# Patient Record
Sex: FEMALE | Race: BLACK OR AFRICAN AMERICAN | NOT HISPANIC OR LATINO | URBAN - METROPOLITAN AREA
[De-identification: names, ages, dates, MRNs, and addresses within clinical notes are randomized per-mention and may not be internally consistent; named-entity substitution may affect disease eponyms.]

---

## 2017-01-06 ENCOUNTER — FOLLOW UP ESTABLISHED (OUTPATIENT)
Dept: URBAN - METROPOLITAN AREA CLINIC 24 | Facility: CLINIC | Age: 82
End: 2017-01-06
Payer: MEDICARE

## 2017-01-06 PROCEDURE — 92012 INTRM OPH EXAM EST PATIENT: CPT | Performed by: OPHTHALMOLOGY

## 2017-01-06 ASSESSMENT — INTRAOCULAR PRESSURE
OS: 12
OD: 13

## 2017-05-05 ENCOUNTER — FOLLOW UP ESTABLISHED (OUTPATIENT)
Dept: URBAN - METROPOLITAN AREA CLINIC 24 | Facility: CLINIC | Age: 82
End: 2017-05-05
Payer: MEDICARE

## 2017-05-05 PROCEDURE — 92014 COMPRE OPH EXAM EST PT 1/>: CPT | Performed by: OPHTHALMOLOGY

## 2017-05-05 PROCEDURE — 92133 CPTRZD OPH DX IMG PST SGM ON: CPT | Performed by: OPHTHALMOLOGY

## 2017-05-05 ASSESSMENT — INTRAOCULAR PRESSURE
OD: 12
OS: 12

## 2017-09-01 ENCOUNTER — FOLLOW UP ESTABLISHED (OUTPATIENT)
Dept: URBAN - METROPOLITAN AREA CLINIC 24 | Facility: CLINIC | Age: 82
End: 2017-09-01
Payer: MEDICARE

## 2017-09-01 PROCEDURE — 92083 EXTENDED VISUAL FIELD XM: CPT | Performed by: OPHTHALMOLOGY

## 2017-09-01 PROCEDURE — 92012 INTRM OPH EXAM EST PATIENT: CPT | Performed by: OPHTHALMOLOGY

## 2017-09-01 RX ORDER — BIMATOPROST 0.1 MG/ML
0.01 % SOLUTION/ DROPS OPHTHALMIC
Qty: 3 | Refills: 1 | Status: INACTIVE
Start: 2017-09-01 | End: 2018-03-30

## 2017-09-01 RX ORDER — DORZOLAMIDE HYDROCHLORIDE AND TIMOLOL MALEATE 20; 5 MG/ML; MG/ML
SOLUTION/ DROPS OPHTHALMIC
Qty: 180 | Refills: 5 | Status: INACTIVE
Start: 2017-09-01 | End: 2018-03-30

## 2017-09-01 ASSESSMENT — INTRAOCULAR PRESSURE
OD: 12
OS: 11

## 2017-11-29 ENCOUNTER — FOLLOW UP ESTABLISHED (OUTPATIENT)
Dept: URBAN - METROPOLITAN AREA CLINIC 24 | Facility: CLINIC | Age: 82
End: 2017-11-29
Payer: MEDICARE

## 2017-11-29 DIAGNOSIS — H40.1333 PIGMENTARY GLAUCOMA, BILATERAL, SEVERE STAGE: Primary | ICD-10-CM

## 2017-11-29 DIAGNOSIS — H04.123 DRY EYE SYNDROME OF BILATERAL LACRIMAL GLANDS: ICD-10-CM

## 2017-11-29 PROCEDURE — 92012 INTRM OPH EXAM EST PATIENT: CPT | Performed by: OPHTHALMOLOGY

## 2017-11-29 ASSESSMENT — INTRAOCULAR PRESSURE
OD: 12
OS: 12

## 2018-03-30 ENCOUNTER — FOLLOW UP ESTABLISHED (OUTPATIENT)
Dept: URBAN - METROPOLITAN AREA CLINIC 24 | Facility: CLINIC | Age: 83
End: 2018-03-30
Payer: MEDICARE

## 2018-03-30 PROCEDURE — 92014 COMPRE OPH EXAM EST PT 1/>: CPT | Performed by: OPHTHALMOLOGY

## 2018-03-30 RX ORDER — BIMATOPROST 0.1 MG/ML
0.01 % SOLUTION/ DROPS OPHTHALMIC
Qty: 3 | Refills: 1 | Status: INACTIVE
Start: 2018-03-30 | End: 2019-05-06

## 2018-03-30 RX ORDER — DORZOLAMIDE HYDROCHLORIDE AND TIMOLOL MALEATE 20; 5 MG/ML; MG/ML
SOLUTION/ DROPS OPHTHALMIC
Qty: 180 | Refills: 5 | Status: INACTIVE
Start: 2018-03-30 | End: 2019-05-06

## 2018-03-30 ASSESSMENT — INTRAOCULAR PRESSURE
OD: 13
OS: 13

## 2018-07-25 ENCOUNTER — FOLLOW UP ESTABLISHED (OUTPATIENT)
Dept: URBAN - METROPOLITAN AREA CLINIC 24 | Facility: CLINIC | Age: 83
End: 2018-07-25
Payer: MEDICARE

## 2018-07-25 PROCEDURE — 92083 EXTENDED VISUAL FIELD XM: CPT | Performed by: OPHTHALMOLOGY

## 2018-07-25 PROCEDURE — 92012 INTRM OPH EXAM EST PATIENT: CPT | Performed by: OPHTHALMOLOGY

## 2018-07-25 ASSESSMENT — INTRAOCULAR PRESSURE
OS: 12
OD: 13

## 2018-10-17 ENCOUNTER — FOLLOW UP ESTABLISHED (OUTPATIENT)
Dept: URBAN - METROPOLITAN AREA CLINIC 24 | Facility: CLINIC | Age: 83
End: 2018-10-17
Payer: MEDICARE

## 2018-10-17 PROCEDURE — 92012 INTRM OPH EXAM EST PATIENT: CPT | Performed by: OPHTHALMOLOGY

## 2018-10-17 PROCEDURE — 92133 CPTRZD OPH DX IMG PST SGM ON: CPT | Performed by: OPHTHALMOLOGY

## 2018-10-17 ASSESSMENT — INTRAOCULAR PRESSURE
OS: 13
OD: 14

## 2019-02-06 ENCOUNTER — FOLLOW UP ESTABLISHED (OUTPATIENT)
Dept: URBAN - METROPOLITAN AREA CLINIC 24 | Facility: CLINIC | Age: 84
End: 2019-02-06
Payer: MEDICARE

## 2019-02-06 PROCEDURE — 92012 INTRM OPH EXAM EST PATIENT: CPT | Performed by: OPHTHALMOLOGY

## 2019-02-06 ASSESSMENT — INTRAOCULAR PRESSURE
OD: 14
OS: 14

## 2019-02-13 ENCOUNTER — FOLLOW UP ESTABLISHED (OUTPATIENT)
Dept: URBAN - METROPOLITAN AREA CLINIC 24 | Facility: CLINIC | Age: 84
End: 2019-02-13
Payer: MEDICARE

## 2019-02-13 DIAGNOSIS — H52.4 PRESBYOPIA: ICD-10-CM

## 2019-02-13 PROCEDURE — 92014 COMPRE OPH EXAM EST PT 1/>: CPT | Performed by: OPTOMETRIST

## 2019-02-13 ASSESSMENT — INTRAOCULAR PRESSURE
OS: 14
OD: 14

## 2019-02-13 ASSESSMENT — VISUAL ACUITY
OD: 20/20
OS: 20/20

## 2019-02-13 ASSESSMENT — KERATOMETRY
OS: 44.36
OD: 44.24

## 2019-06-12 ENCOUNTER — FOLLOW UP ESTABLISHED (OUTPATIENT)
Dept: URBAN - METROPOLITAN AREA CLINIC 24 | Facility: CLINIC | Age: 84
End: 2019-06-12
Payer: MEDICARE

## 2019-06-12 PROCEDURE — 92083 EXTENDED VISUAL FIELD XM: CPT | Performed by: OPHTHALMOLOGY

## 2019-06-12 PROCEDURE — 92012 INTRM OPH EXAM EST PATIENT: CPT | Performed by: OPHTHALMOLOGY

## 2019-06-12 ASSESSMENT — INTRAOCULAR PRESSURE
OS: 14
OD: 13

## 2019-10-30 ENCOUNTER — FOLLOW UP ESTABLISHED (OUTPATIENT)
Dept: URBAN - METROPOLITAN AREA CLINIC 24 | Facility: CLINIC | Age: 84
End: 2019-10-30
Payer: MEDICARE

## 2019-10-30 PROCEDURE — 92012 INTRM OPH EXAM EST PATIENT: CPT | Performed by: OPHTHALMOLOGY

## 2019-10-30 PROCEDURE — 92133 CPTRZD OPH DX IMG PST SGM ON: CPT | Performed by: OPHTHALMOLOGY

## 2019-10-30 ASSESSMENT — INTRAOCULAR PRESSURE
OS: 13
OD: 13

## 2019-11-26 ENCOUNTER — APPOINTMENT (RX ONLY)
Dept: URBAN - METROPOLITAN AREA CLINIC 323 | Facility: CLINIC | Age: 84
Setting detail: DERMATOLOGY
End: 2019-11-26

## 2019-11-26 DIAGNOSIS — L81.4 OTHER MELANIN HYPERPIGMENTATION: ICD-10-CM

## 2019-11-26 DIAGNOSIS — D18.0 HEMANGIOMA: ICD-10-CM

## 2019-11-26 DIAGNOSIS — L82.1 OTHER SEBORRHEIC KERATOSIS: ICD-10-CM

## 2019-11-26 DIAGNOSIS — D22 MELANOCYTIC NEVI: ICD-10-CM

## 2019-11-26 DIAGNOSIS — L72.8 OTHER FOLLICULAR CYSTS OF THE SKIN AND SUBCUTANEOUS TISSUE: ICD-10-CM

## 2019-11-26 PROBLEM — D22.5 MELANOCYTIC NEVI OF TRUNK: Status: ACTIVE | Noted: 2019-11-26

## 2019-11-26 PROBLEM — D22.72 MELANOCYTIC NEVI OF LEFT LOWER LIMB, INCLUDING HIP: Status: ACTIVE | Noted: 2019-11-26

## 2019-11-26 PROBLEM — D18.01 HEMANGIOMA OF SKIN AND SUBCUTANEOUS TISSUE: Status: ACTIVE | Noted: 2019-11-26

## 2019-11-26 PROBLEM — D22.62 MELANOCYTIC NEVI OF LEFT UPPER LIMB, INCLUDING SHOULDER: Status: ACTIVE | Noted: 2019-11-26

## 2019-11-26 PROBLEM — D22.61 MELANOCYTIC NEVI OF RIGHT UPPER LIMB, INCLUDING SHOULDER: Status: ACTIVE | Noted: 2019-11-26

## 2019-11-26 PROBLEM — D22.71 MELANOCYTIC NEVI OF RIGHT LOWER LIMB, INCLUDING HIP: Status: ACTIVE | Noted: 2019-11-26

## 2019-11-26 PROCEDURE — 11401 EXC TR-EXT B9+MARG 0.6-1 CM: CPT | Mod: 76

## 2019-11-26 PROCEDURE — ? SUNSCREEN RECOMMENDATIONS

## 2019-11-26 PROCEDURE — 11401 EXC TR-EXT B9+MARG 0.6-1 CM: CPT

## 2019-11-26 PROCEDURE — ? COUNSELING

## 2019-11-26 PROCEDURE — 99203 OFFICE O/P NEW LOW 30 MIN: CPT | Mod: 25

## 2019-11-26 PROCEDURE — ? PUNCH EXCISION

## 2019-11-26 ASSESSMENT — LOCATION SIMPLE DESCRIPTION DERM
LOCATION SIMPLE: ABDOMEN
LOCATION SIMPLE: RIGHT UPPER BACK
LOCATION SIMPLE: LEFT PRETIBIAL REGION
LOCATION SIMPLE: UPPER BACK
LOCATION SIMPLE: LEFT FOREARM
LOCATION SIMPLE: RIGHT CHEEK
LOCATION SIMPLE: RIGHT PRETIBIAL REGION
LOCATION SIMPLE: LEFT UPPER ARM
LOCATION SIMPLE: RIGHT FOREARM
LOCATION SIMPLE: LEFT UPPER BACK

## 2019-11-26 ASSESSMENT — LOCATION ZONE DERM
LOCATION ZONE: LEG
LOCATION ZONE: ARM
LOCATION ZONE: FACE
LOCATION ZONE: TRUNK

## 2019-11-26 ASSESSMENT — LOCATION DETAILED DESCRIPTION DERM
LOCATION DETAILED: RIGHT INFERIOR CENTRAL MALAR CHEEK
LOCATION DETAILED: SUPERIOR THORACIC SPINE
LOCATION DETAILED: LEFT SUPERIOR UPPER BACK
LOCATION DETAILED: INFERIOR THORACIC SPINE
LOCATION DETAILED: LEFT VENTRAL DISTAL FOREARM
LOCATION DETAILED: RIGHT MID-UPPER BACK
LOCATION DETAILED: EPIGASTRIC SKIN
LOCATION DETAILED: RIGHT VENTRAL DISTAL FOREARM
LOCATION DETAILED: LEFT DISTAL PRETIBIAL REGION
LOCATION DETAILED: RIGHT DISTAL PRETIBIAL REGION
LOCATION DETAILED: LEFT LATERAL UPPER BACK
LOCATION DETAILED: LEFT VENTRAL PROXIMAL FOREARM
LOCATION DETAILED: LEFT PROXIMAL PRETIBIAL REGION
LOCATION DETAILED: LEFT ANTECUBITAL SKIN
LOCATION DETAILED: LEFT MEDIAL UPPER BACK
LOCATION DETAILED: RIGHT INFERIOR MEDIAL UPPER BACK
LOCATION DETAILED: RIGHT PROXIMAL PRETIBIAL REGION
LOCATION DETAILED: RIGHT VENTRAL PROXIMAL FOREARM

## 2019-11-26 NOTE — PROCEDURE: PUNCH EXCISION
Excision Depth: adipose tissue
Complex Repair Preamble Text (Leave Blank If You Do Not Want): Extensive wide undermining was performed.
5 Mm Punch Excision Text: A 5 mm punch biopsy was used to make an initial incision over the lesion.  After this overlying column of skin was removed, blunt dissection was used to free the lesion from the surrounding tissues and the lesion was extirpated through the surgical opening made by the punch biopsy.
Medical Necessity Clause: This procedure was medically necessary because the lesion that was treated was:
6 Mm Punch Excision Text: A 6 mm punch biopsy was used to make an initial incision over the lesion.  After this overlying column of skin was removed, blunt dissection was used to free the lesion from the surrounding tissues and the lesion was extirpated through the surgical opening made by the punch biopsy.
Anesthesia Volume In Cc: 0
Lab: -[id number]
Bill For Surgical Tray: no
Consent was obtained from the patient. The risks and benefits to therapy were discussed in detail. Specifically, the risks of infection, scarring, bleeding, prolonged wound healing, incomplete removal, allergy to anesthesia, nerve injury and recurrence were addressed. Prior to the procedure, the treatment site was clearly identified and confirmed by the patient. All components of Universal Protocol/PAUSE Rule completed.
Lab Facility: [id number]
Intermediate Repair Preamble Text (Leave Blank If You Do Not Want): Undermining was performed with blunt dissection.
Additional Anesthesia Volume In Cc: 6
Excision Method: 6 mm Punch
7 Mm Punch Excision Text: A 7 mm punch biopsy was used to make an initial incision over the lesion.  After this overlying column of skin was removed, blunt dissection was used to free the lesion from the surrounding tissues and the lesion was extirpated through the surgical opening made by the punch biopsy.
Medical Necessity Clause: The excision was medically necessary because the lesion which was excised was
Wound Care: Petrolatum
Detail Level: Detailed
Anesthesia Type: 1% lidocaine with epinephrine
1.5 Mm Punch Excision Text: A 1.5 mm punch biopsy was used to make an initial incision over the lesion.  After this overlying column of skin was removed, blunt dissection was used to free the lesion from the surrounding tissues and the lesion was extirpated through the surgical opening made by the punch biopsy.
Render Post-Care Instructions In Note?: yes
Epidermal Sutures: 4-0 Ethilon
8 Mm Punch Excision Text: A 8 mm punch biopsy was used to make an initial incision over the lesion.  After this overlying column of skin was removed, blunt dissection was used to free the lesion from the surrounding tissues and the lesion was extirpated through the surgical opening made by the punch biopsy.
Repair Type: None
10 Mm Punch Excision Text: A 10 mm punch biopsy was used to make an initial incision over the lesion.  After this overlying column of skin was removed, blunt dissection was used to free the lesion from the surrounding tissues and the lesion was extirpated through the surgical opening made by the punch biopsy.
2 Mm Punch Excision Text: A 2 mm punch biopsy was used to make an initial incision over the lesion.  After this overlying column of skin was removed, blunt dissection was used to free the lesion from the surrounding tissues and the lesion was extirpated through the surgical opening made by the punch biopsy.
Path Notes (To The Dermatopathologist): Please check margins.
2.5 Mm Punch Excision Text: A 2.5 mm punch biopsy was used to make an initial incision over the lesion.  After this overlying column of skin was removed, blunt dissection was used to free the lesion from the surrounding tissues and the lesion was extirpated through the surgical opening made by the punch biopsy.
Post-Care Instructions: I reviewed with the patient in detail post-care instructions. Patient is not to engage in any heavy lifting, exercise, or swimming for the next 14 days. Should the patient develop any fevers, chills, bleeding, severe pain patient will contact the office immediately.
Hemostasis: Electrocautery
12 Mm Punch Excision Text: A 12 mm punch biopsy was used to make an initial incision over the lesion.  After this overlying column of skin was removed, blunt dissection was used to free the lesion from the surrounding tissues and the lesion was extirpated through the surgical opening made by the punch biopsy.
Dressing: dry sterile dressing
3 Mm Punch Excision Text: A 3 mm punch biopsy was used to make an initial incision over the lesion.  After this overlying column of skin was removed, blunt dissection was used to free the lesion from the surrounding tissues and the lesion was extirpated through the surgical opening made by the punch biopsy.
Estimated Blood Loss (Cc): minimal
Purse String (Intermediate) Text: Given the location of the defect and the characteristics of the surrounding skin a purse string intermediate closure was deemed most appropriate.  Undermining was performed circumfirentially around the surgical defect.  A purse string suture was then placed and tightened.
Deep Sutures: 5-0 Vicryl
3.5 Mm Punch Excision Text: A 3.5 mm punch biopsy was used to make an initial incision over the lesion.  After this overlying column of skin was removed, blunt dissection was used to free the lesion from the surrounding tissues and the lesion was extirpated through the surgical opening made by the punch biopsy.
4 Mm Punch Excision Text: A 4 mm punch biopsy was used to make an initial incision over the lesion.  After this overlying column of skin was removed, blunt dissection was used to free the lesion from the surrounding tissues and the lesion was extirpated through the surgical opening made by the punch biopsy.
Size Of Lesion In Cm: 1
Suture Removal: 14 days
Epidermal Closure: simple interrupted
4.5 Mm Punch Excision Text: A 4.5 mm punch biopsy was used to make an initial incision over the lesion.  After this overlying column of skin was removed, blunt dissection was used to free the lesion from the surrounding tissues and the lesion was extirpated through the surgical opening made by the punch biopsy.
Billing Type: Third-Party Bill
Size Of Lesion In Cm: 0.8
Size Of Lesion In Cm: 0.6

## 2019-12-04 ENCOUNTER — APPOINTMENT (RX ONLY)
Dept: URBAN - METROPOLITAN AREA CLINIC 323 | Facility: CLINIC | Age: 84
Setting detail: DERMATOLOGY
End: 2019-12-04

## 2019-12-04 DIAGNOSIS — L72.8 OTHER FOLLICULAR CYSTS OF THE SKIN AND SUBCUTANEOUS TISSUE: ICD-10-CM

## 2019-12-04 DIAGNOSIS — L72.0 EPIDERMAL CYST: ICD-10-CM

## 2019-12-04 PROCEDURE — ? PRESCRIPTION

## 2019-12-04 PROCEDURE — ? COUNSELING

## 2019-12-04 PROCEDURE — 99213 OFFICE O/P EST LOW 20 MIN: CPT | Mod: 24,25

## 2019-12-04 PROCEDURE — 10060 I&D ABSCESS SIMPLE/SINGLE: CPT | Mod: 79

## 2019-12-04 PROCEDURE — ? INCISION AND DRAINAGE

## 2019-12-04 RX ORDER — TRETIONIN 0.25 MG/G
CREAM TOPICAL
Qty: 1 | Refills: 3 | Status: CANCELLED
Stop reason: SDUPTHER

## 2019-12-04 ASSESSMENT — LOCATION ZONE DERM
LOCATION ZONE: EAR
LOCATION ZONE: FACE

## 2019-12-04 ASSESSMENT — LOCATION DETAILED DESCRIPTION DERM
LOCATION DETAILED: RIGHT CYMBA CONCHA
LOCATION DETAILED: LEFT CENTRAL BUCCAL CHEEK
LOCATION DETAILED: RIGHT CRUS OF HELIX

## 2019-12-04 ASSESSMENT — LOCATION SIMPLE DESCRIPTION DERM
LOCATION SIMPLE: RIGHT EAR
LOCATION SIMPLE: LEFT CHEEK

## 2019-12-04 NOTE — PROCEDURE: INCISION AND DRAINAGE
Epidermal Closure: simple interrupted
Curette: No
Consent was obtained and risks were reviewed including but not limited to delayed wound healing, infection, need for multiple I and D's, and pain.
Lesion Type: Abscess
Method: 11 blade
Preparation Text: The area was prepped in the usual clean fashion.
Post-Care Instructions: I reviewed with the patient in detail post-care instructions. Patient should keep wound covered and call the office should any redness, pain, swelling or worsening occur.
Epidermal Sutures: 4-0 Ethilon
Detail Level: Simple
Dressing: dry sterile dressing
Anesthesia Type: 1% lidocaine with epinephrine
Size Of Lesion In Cm (Optional But May Be Required For Some Insurances): 0
Curette Text (Optional): After the contents were expressed a curette was used to partially remove the cyst wall.
Wound Care: Petrolatum
Suture Text: The incision was partially closed with

## 2020-02-05 ENCOUNTER — FOLLOW UP ESTABLISHED (OUTPATIENT)
Dept: URBAN - METROPOLITAN AREA CLINIC 24 | Facility: CLINIC | Age: 85
End: 2020-02-05
Payer: MEDICARE

## 2020-02-05 PROCEDURE — 92012 INTRM OPH EXAM EST PATIENT: CPT | Performed by: OPHTHALMOLOGY

## 2020-02-05 RX ORDER — BIMATOPROST 0.1 MG/ML
0.01 % SOLUTION/ DROPS OPHTHALMIC
Qty: 3 | Refills: 1 | Status: INACTIVE
Start: 2020-02-05 | End: 2020-12-02

## 2020-02-05 ASSESSMENT — INTRAOCULAR PRESSURE
OS: 13
OD: 13

## 2020-06-04 ENCOUNTER — RX ONLY (OUTPATIENT)
Age: 85
Setting detail: RX ONLY
End: 2020-06-04

## 2020-06-04 RX ORDER — TRETIONIN 0.25 MG/G
CREAM TOPICAL
Qty: 1 | Refills: 0 | COMMUNITY
Start: 2020-06-04

## 2020-06-19 ENCOUNTER — FOLLOW UP ESTABLISHED (OUTPATIENT)
Dept: URBAN - METROPOLITAN AREA CLINIC 24 | Facility: CLINIC | Age: 85
End: 2020-06-19
Payer: MEDICARE

## 2020-06-19 DIAGNOSIS — H02.423 MYOGENIC PTOSIS OF BILATERAL EYELIDS: ICD-10-CM

## 2020-06-19 PROCEDURE — 92083 EXTENDED VISUAL FIELD XM: CPT | Performed by: OPHTHALMOLOGY

## 2020-06-19 PROCEDURE — 92012 INTRM OPH EXAM EST PATIENT: CPT | Performed by: OPHTHALMOLOGY

## 2020-06-19 ASSESSMENT — INTRAOCULAR PRESSURE
OD: 13
OS: 12

## 2020-07-08 ENCOUNTER — APPOINTMENT (RX ONLY)
Dept: URBAN - METROPOLITAN AREA CLINIC 323 | Facility: CLINIC | Age: 85
Setting detail: DERMATOLOGY
End: 2020-07-08

## 2020-07-08 DIAGNOSIS — I87.2 VENOUS INSUFFICIENCY (CHRONIC) (PERIPHERAL): ICD-10-CM

## 2020-07-08 DIAGNOSIS — L81.5 LEUKODERMA, NOT ELSEWHERE CLASSIFIED: ICD-10-CM

## 2020-07-08 PROCEDURE — 99213 OFFICE O/P EST LOW 20 MIN: CPT

## 2020-07-08 PROCEDURE — ? WOOD'S LAMP

## 2020-07-08 PROCEDURE — ? COUNSELING

## 2020-07-08 ASSESSMENT — LOCATION DETAILED DESCRIPTION DERM
LOCATION DETAILED: LEFT DISTAL PRETIBIAL REGION
LOCATION DETAILED: RIGHT DISTAL PRETIBIAL REGION

## 2020-07-08 ASSESSMENT — LOCATION SIMPLE DESCRIPTION DERM
LOCATION SIMPLE: LEFT PRETIBIAL REGION
LOCATION SIMPLE: RIGHT PRETIBIAL REGION

## 2020-07-08 ASSESSMENT — LOCATION ZONE DERM: LOCATION ZONE: LEG

## 2020-07-08 NOTE — HPI: RASH
What Type Of Note Output Would You Prefer (Optional)?: Standard Output
How Severe Is Your Rash?: moderate
Is This A New Presentation, Or A Follow-Up?: Rash
Additional History: 98.6 temp

## 2020-10-09 ENCOUNTER — FOLLOW UP ESTABLISHED (OUTPATIENT)
Dept: URBAN - METROPOLITAN AREA CLINIC 24 | Facility: CLINIC | Age: 85
End: 2020-10-09
Payer: MEDICARE

## 2020-10-09 PROCEDURE — 92012 INTRM OPH EXAM EST PATIENT: CPT | Performed by: OPHTHALMOLOGY

## 2020-10-09 PROCEDURE — 92133 CPTRZD OPH DX IMG PST SGM ON: CPT | Performed by: OPHTHALMOLOGY

## 2020-10-09 ASSESSMENT — INTRAOCULAR PRESSURE
OS: 13
OD: 13

## 2020-12-03 ENCOUNTER — RX ONLY (OUTPATIENT)
Age: 85
Setting detail: RX ONLY
End: 2020-12-03

## 2020-12-03 RX ORDER — TRETIONIN 0.25 MG/G
CREAM TOPICAL
Qty: 1 | Refills: 0

## 2021-02-26 ENCOUNTER — FOLLOW UP ESTABLISHED (OUTPATIENT)
Dept: URBAN - METROPOLITAN AREA CLINIC 24 | Facility: CLINIC | Age: 86
End: 2021-02-26
Payer: MEDICARE

## 2021-02-26 PROCEDURE — 99213 OFFICE O/P EST LOW 20 MIN: CPT | Performed by: OPHTHALMOLOGY

## 2021-02-26 PROCEDURE — 92083 EXTENDED VISUAL FIELD XM: CPT | Performed by: OPHTHALMOLOGY

## 2021-02-26 RX ORDER — DORZOLAMIDE HYDROCHLORIDE AND TIMOLOL MALEATE 20; 5 MG/ML; MG/ML
SOLUTION/ DROPS OPHTHALMIC
Qty: 180 | Refills: 1 | Status: INACTIVE
Start: 2021-02-26 | End: 2021-06-18

## 2021-02-26 RX ORDER — BIMATOPROST 0.1 MG/ML
0.01 % SOLUTION/ DROPS OPHTHALMIC
Qty: 7.5 | Refills: 1 | Status: INACTIVE
Start: 2021-02-26 | End: 2021-06-18

## 2021-02-26 ASSESSMENT — INTRAOCULAR PRESSURE
OS: 14
OD: 13

## 2021-06-15 ENCOUNTER — APPOINTMENT (RX ONLY)
Dept: URBAN - METROPOLITAN AREA CLINIC 323 | Facility: CLINIC | Age: 86
Setting detail: DERMATOLOGY
End: 2021-06-15

## 2021-06-15 DIAGNOSIS — L82.1 OTHER SEBORRHEIC KERATOSIS: ICD-10-CM

## 2021-06-15 DIAGNOSIS — D22 MELANOCYTIC NEVI: ICD-10-CM

## 2021-06-15 DIAGNOSIS — D18.0 HEMANGIOMA: ICD-10-CM

## 2021-06-15 DIAGNOSIS — L80 VITILIGO: ICD-10-CM

## 2021-06-15 DIAGNOSIS — L81.4 OTHER MELANIN HYPERPIGMENTATION: ICD-10-CM

## 2021-06-15 PROBLEM — D18.01 HEMANGIOMA OF SKIN AND SUBCUTANEOUS TISSUE: Status: ACTIVE | Noted: 2021-06-15

## 2021-06-15 PROBLEM — D22.61 MELANOCYTIC NEVI OF RIGHT UPPER LIMB, INCLUDING SHOULDER: Status: ACTIVE | Noted: 2021-06-15

## 2021-06-15 PROBLEM — D22.71 MELANOCYTIC NEVI OF RIGHT LOWER LIMB, INCLUDING HIP: Status: ACTIVE | Noted: 2021-06-15

## 2021-06-15 PROBLEM — D22.72 MELANOCYTIC NEVI OF LEFT LOWER LIMB, INCLUDING HIP: Status: ACTIVE | Noted: 2021-06-15

## 2021-06-15 PROBLEM — D22.5 MELANOCYTIC NEVI OF TRUNK: Status: ACTIVE | Noted: 2021-06-15

## 2021-06-15 PROBLEM — D22.62 MELANOCYTIC NEVI OF LEFT UPPER LIMB, INCLUDING SHOULDER: Status: ACTIVE | Noted: 2021-06-15

## 2021-06-15 PROCEDURE — ? COUNSELING

## 2021-06-15 PROCEDURE — ? ADDITIONAL NOTES

## 2021-06-15 PROCEDURE — ? FULL BODY SKIN EXAM

## 2021-06-15 PROCEDURE — 99213 OFFICE O/P EST LOW 20 MIN: CPT

## 2021-06-15 PROCEDURE — ? SUNSCREEN RECOMMENDATIONS

## 2021-06-15 PROCEDURE — ? PRESCRIPTION

## 2021-06-15 RX ORDER — CLOBETASOL PROPIONATE 0.5 MG/G
CREAM TOPICAL
Qty: 1 | Refills: 3 | Status: ERX | COMMUNITY
Start: 2021-06-15

## 2021-06-15 RX ADMIN — CLOBETASOL PROPIONATE: 0.5 CREAM TOPICAL at 00:00

## 2021-06-15 ASSESSMENT — LOCATION ZONE DERM
LOCATION ZONE: LEG
LOCATION ZONE: ARM
LOCATION ZONE: FACE
LOCATION ZONE: TRUNK

## 2021-06-15 ASSESSMENT — LOCATION DETAILED DESCRIPTION DERM
LOCATION DETAILED: RIGHT INFERIOR CENTRAL MALAR CHEEK
LOCATION DETAILED: LEFT PROXIMAL PRETIBIAL REGION
LOCATION DETAILED: RIGHT VENTRAL DISTAL FOREARM
LOCATION DETAILED: LEFT VENTRAL DISTAL FOREARM
LOCATION DETAILED: LEFT VENTRAL PROXIMAL FOREARM
LOCATION DETAILED: EPIGASTRIC SKIN
LOCATION DETAILED: INFERIOR THORACIC SPINE
LOCATION DETAILED: SUPERIOR THORACIC SPINE
LOCATION DETAILED: RIGHT INFERIOR MEDIAL UPPER BACK
LOCATION DETAILED: RIGHT DISTAL PRETIBIAL REGION
LOCATION DETAILED: RIGHT PROXIMAL PRETIBIAL REGION
LOCATION DETAILED: LEFT DISTAL PRETIBIAL REGION
LOCATION DETAILED: RIGHT MID-UPPER BACK
LOCATION DETAILED: LEFT ANTECUBITAL SKIN
LOCATION DETAILED: RIGHT VENTRAL PROXIMAL FOREARM

## 2021-06-15 ASSESSMENT — LOCATION SIMPLE DESCRIPTION DERM
LOCATION SIMPLE: RIGHT FOREARM
LOCATION SIMPLE: LEFT PRETIBIAL REGION
LOCATION SIMPLE: LEFT UPPER ARM
LOCATION SIMPLE: RIGHT PRETIBIAL REGION
LOCATION SIMPLE: ABDOMEN
LOCATION SIMPLE: LEFT FOREARM
LOCATION SIMPLE: UPPER BACK
LOCATION SIMPLE: RIGHT CHEEK
LOCATION SIMPLE: RIGHT UPPER BACK

## 2021-06-18 ENCOUNTER — OFFICE VISIT (OUTPATIENT)
Dept: URBAN - METROPOLITAN AREA CLINIC 24 | Facility: CLINIC | Age: 86
End: 2021-06-18
Payer: MEDICARE

## 2021-06-18 PROCEDURE — 99213 OFFICE O/P EST LOW 20 MIN: CPT | Performed by: OPHTHALMOLOGY

## 2021-06-18 RX ORDER — DORZOLAMIDE HYDROCHLORIDE AND TIMOLOL MALEATE 20; 5 MG/ML; MG/ML
SOLUTION/ DROPS OPHTHALMIC
Qty: 180 | Refills: 1 | Status: INACTIVE
Start: 2021-06-18 | End: 2022-04-08

## 2021-06-18 RX ORDER — BIMATOPROST 0.1 MG/ML
0.01 % SOLUTION/ DROPS OPHTHALMIC
Qty: 7.5 | Refills: 1 | Status: INACTIVE
Start: 2021-06-18 | End: 2022-02-03

## 2021-06-18 ASSESSMENT — INTRAOCULAR PRESSURE
OD: 12
OS: 13

## 2021-06-18 NOTE — IMPRESSION/PLAN
Impression: Myogenic ptosis of bilateral eyelids Plan: s/p Bilateral Upper Lid External Levator Resection OU  09/18/2014 Dr. Rosas Lamb, healed well.

## 2021-06-18 NOTE — IMPRESSION/PLAN
Impression: Other secondary cataract, bilateral Plan: Continue to monitor. yag eval when patient desires ; offered update mrx also-pt will pursue when desires

## 2021-06-18 NOTE — IMPRESSION/PLAN
Impression: Pigmentary glaucoma, bilateral, severe stage
 + FHx of Glaucoma (MOM);  -Sulfa Allergy/Heart or Lung Problems/Sleep Apnea/Hx of Migraines
-  10/17/07; s/p Baerveldt 250 OS; possible steroid response OD ; brand name cosopt not liked; SLT OU(06) 
-no fdts Plan: PLAN: On Lumigan QHS OU, Cosopt PF BID OU (from using QHS OU, unable to use brand name cosopt (puja burning) ;  IOP is doing well ou ,so will cont meds the same and rtc in 3-4 months for IOP check/OCT      (( TARGET IOP ~< mid to low teens   OU )) TESTS:   
2/26/21 VF OD) inf scatter. VF OS) sup nasal altitudinal loss, fluctuates. 10/9/20 OCT RNFL OD) 81 (87, 91, 95 91), normal. OCT RNFL OS) 55 (59, 69, 63 63), pole thinning. 12/14/11 FDT few scattered defects OD, OS-shows signif sup and inf loss-stable;
4/11/12 Photos hazy OD, cupping OS stable; tests reviewed Discussed Glaucoma diagnosis in detail with patient. Emphasized and explained compliance. Poor compliance can lead to blindness. BRING YOUR DROPS EVERY VISIT.

## 2021-06-18 NOTE — IMPRESSION/PLAN
Impression: Tear film insufficiency of bilateral lacrimal glands Plan: resume preservative-free ATs and gel qhs,   but told to call asap if any worsening.

## 2021-07-28 ENCOUNTER — OFFICE VISIT (OUTPATIENT)
Dept: URBAN - METROPOLITAN AREA CLINIC 24 | Facility: CLINIC | Age: 86
End: 2021-07-28
Payer: MEDICARE

## 2021-07-28 DIAGNOSIS — H11.31 SUBCONJUNCTIVAL HEMORRHAGE OF RIGHT EYE: ICD-10-CM

## 2021-07-28 PROCEDURE — 92014 COMPRE OPH EXAM EST PT 1/>: CPT | Performed by: OPTOMETRIST

## 2021-07-28 ASSESSMENT — KERATOMETRY
OS: 44.75
OD: 44.30

## 2021-07-28 ASSESSMENT — VISUAL ACUITY
OD: 20/20
OS: 20/20

## 2021-07-28 ASSESSMENT — INTRAOCULAR PRESSURE
OD: 12
OS: 12

## 2021-07-28 NOTE — IMPRESSION/PLAN
Impression: Subconjunctival hemorrhage of right eye: H11.31. Plan: Reassured patient. RTC if any changes in vision or new onset/worsening of symptoms.

## 2021-07-28 NOTE — IMPRESSION/PLAN
Impression: Pigmentary glaucoma, bilateral, severe stage
 + FHx of Glaucoma (MOM);  -Sulfa Allergy/Heart or Lung Problems/Sleep Apnea/Hx of Migraines
-  10/17/07; s/p Baerveldt 250 OS; possible steroid response OD ; brand name cosopt not liked; SLT OU(06) 
-no fdts Plan: Under care of Dr. Stan Brewster. IOP: 12/12, doing well today Continue glaucoma drops as instructed

## 2021-07-28 NOTE — IMPRESSION/PLAN
Impression: Tear film insufficiency of bilateral lacrimal glands Plan: Continue preservative-free ATs and gel qhs.

## 2021-11-05 ENCOUNTER — OFFICE VISIT (OUTPATIENT)
Dept: URBAN - METROPOLITAN AREA CLINIC 24 | Facility: CLINIC | Age: 86
End: 2021-11-05
Payer: MEDICARE

## 2021-11-05 PROCEDURE — 92133 CPTRZD OPH DX IMG PST SGM ON: CPT | Performed by: OPHTHALMOLOGY

## 2021-11-05 PROCEDURE — 99214 OFFICE O/P EST MOD 30 MIN: CPT | Performed by: OPHTHALMOLOGY

## 2021-11-05 ASSESSMENT — INTRAOCULAR PRESSURE
OS: 12
OD: 12

## 2021-11-05 NOTE — IMPRESSION/PLAN
Impression: Pigmentary glaucoma, bilateral, severe stage
 + FHx of Glaucoma (MOM);  -Sulfa Allergy/Heart or Lung Problems/Sleep Apnea/Hx of Migraines
-  10/17/07; s/p Baerveldt 250 OS; possible steroid response OD ; brand name cosopt not liked; SLT OU(06) 
-no fdts Plan: PLAN: On Lumigan QHS OU, Cosopt PF BID OU (from using QHS OU, unable to use brand name cosopt (puja burning) ; OCT is  similar ou and IOP is holding ou ,so will cont meds the same and return in 3-4 months for IOP check/VFT if able         (( TARGET IOP ~< mid to low teens   OU )) TESTS:   
11/5/21 OCT - OD: Fair-90 (81, 87, 91, 95 91), normal; OS: Fair-60 (55, 59, 69, 63 63), pole thinning 2/26/21 VF OD) inf scatter. VF OS) sup nasal altitudinal loss, fluctuates. 12/14/11 FDT few scattered defects OD, OS-shows signif sup and inf loss-stable;
4/11/12 Photos hazy OD, cupping OS stable; tests reviewed Discussed Glaucoma diagnosis in detail with patient. Emphasized and explained compliance. Poor compliance can lead to blindness. BRING YOUR DROPS EVERY VISIT.

## 2021-11-05 NOTE — IMPRESSION/PLAN
Impression: Other secondary cataract, bilateral Plan: Continue to monitor. yag eval when patient desires ;

## 2021-11-05 NOTE — IMPRESSION/PLAN
Impression: Myogenic ptosis of bilateral eyelids Plan: s/p Bilateral Upper Lid External Levator Resection OU  09/18/2014 Dr. Christopher Gibbons, healed well.

## 2021-11-05 NOTE — IMPRESSION/PLAN
Impression: Tear film insufficiency of bilateral lacrimal glands Plan:   preservative-free ATs and gel qhs still

## 2022-02-25 ENCOUNTER — OFFICE VISIT (OUTPATIENT)
Dept: URBAN - METROPOLITAN AREA CLINIC 24 | Facility: CLINIC | Age: 87
End: 2022-02-25
Payer: MEDICARE

## 2022-02-25 DIAGNOSIS — H26.493 OTHER SECONDARY CATARACT, BILATERAL: ICD-10-CM

## 2022-02-25 PROCEDURE — 99213 OFFICE O/P EST LOW 20 MIN: CPT | Performed by: OPHTHALMOLOGY

## 2022-02-25 PROCEDURE — 92083 EXTENDED VISUAL FIELD XM: CPT | Performed by: OPHTHALMOLOGY

## 2022-02-25 ASSESSMENT — INTRAOCULAR PRESSURE
OD: 13
OS: 13

## 2022-02-25 NOTE — IMPRESSION/PLAN
Impression: Tear film insufficiency of bilateral lacrimal glands Plan: preservative-free ATs and gel qhs

## 2022-02-25 NOTE — IMPRESSION/PLAN
Impression: Pigmentary glaucoma, bilateral, severe stage
 + FHx of Glaucoma (MOM);  -Sulfa Allergy/Heart or Lung Problems/Sleep Apnea/Hx of Migraines
-  10/17/07; s/p Baerveldt 250 OS; possible steroid response OD ; brand name cosopt not liked; SLT OU(06)    ; -no fdts Plan: PLAN: On Lumigan QHS OU, Cosopt PF BID OU (from using QHS OU, unable to use brand name cosopt (puja burning) ; VFT is similar ou and IOP is doing well ou , so will cont meds the same and return in 3-4 months for IOP check and OCT   (( TARGET IOP ~< mid to low teens   OU )) TESTS:   
2/25/22 Visual Field - OD: Fair-inf scatter; OS: Poor-sup nasal altitudinal loss, fluctuates 11/5/21 OCT - OD: Fair-90 (81, 87, 91, 95 91), normal; OS: Fair-60 (55, 59, 69, 63 63), pole thinning 12/14/11 FDT few scattered defects OD, OS-shows signif sup and inf loss-stable;
4/11/12 Photos hazy OD, cupping OS stable; tests reviewed Discussed Glaucoma diagnosis in detail with patient. Emphasized and explained compliance. Poor compliance can lead to blindness. BRING YOUR DROPS EVERY VISIT.

## 2022-02-25 NOTE — IMPRESSION/PLAN
Impression: Myogenic ptosis of bilateral eyelids Plan: s/p Bilateral Upper Lid External Levator Resection OU  09/18/2014 Dr. Alberto Faulkner, healed well.

## 2022-05-20 ENCOUNTER — OFFICE VISIT (OUTPATIENT)
Dept: URBAN - METROPOLITAN AREA CLINIC 24 | Facility: CLINIC | Age: 87
End: 2022-05-20
Payer: MEDICARE

## 2022-05-20 DIAGNOSIS — H40.1333 PIGMENTARY GLAUCOMA, BILATERAL, SEVERE STAGE: ICD-10-CM

## 2022-05-20 DIAGNOSIS — H26.493 OTHER SECONDARY CATARACT, BILATERAL: ICD-10-CM

## 2022-05-20 DIAGNOSIS — H02.423 MYOGENIC PTOSIS OF BILATERAL EYELIDS: ICD-10-CM

## 2022-05-20 DIAGNOSIS — H04.123 DRY EYE SYNDROME OF BILATERAL LACRIMAL GLANDS: Primary | ICD-10-CM

## 2022-05-20 PROCEDURE — 99213 OFFICE O/P EST LOW 20 MIN: CPT | Performed by: OPHTHALMOLOGY

## 2022-05-20 PROCEDURE — 92133 CPTRZD OPH DX IMG PST SGM ON: CPT | Performed by: OPHTHALMOLOGY

## 2022-05-20 RX ORDER — DORZOLAMIDE HYDROCHLORIDE AND TIMOLOL MALEATE 20; 5 MG/ML; MG/ML
SOLUTION/ DROPS OPHTHALMIC
Qty: 15 | Refills: 1 | Status: ACTIVE
Start: 2022-05-20

## 2022-05-20 RX ORDER — DORZOLAMIDE HYDROCHLORIDE AND TIMOLOL MALEATE 20; 5 MG/ML; MG/ML
SOLUTION/ DROPS OPHTHALMIC
Qty: 180 | Refills: 3 | Status: ACTIVE
Start: 2022-05-20

## 2022-05-20 RX ORDER — DORZOLAMIDE HYDROCHLORIDE AND TIMOLOL MALEATE 20; 5 MG/ML; MG/ML
SOLUTION/ DROPS OPHTHALMIC
Qty: 15 | Refills: 1 | Status: INACTIVE
Start: 2022-05-20 | End: 2022-05-20

## 2022-05-20 RX ORDER — BIMATOPROST 0.1 MG/ML
0.01 % SOLUTION/ DROPS OPHTHALMIC
Qty: 7.5 | Refills: 3 | Status: INACTIVE
Start: 2022-05-20 | End: 2022-05-20

## 2022-05-20 RX ORDER — BIMATOPROST 0.1 MG/ML
0.01 % SOLUTION/ DROPS OPHTHALMIC
Qty: 7.5 | Refills: 3 | Status: ACTIVE
Start: 2022-05-20

## 2022-05-20 ASSESSMENT — INTRAOCULAR PRESSURE
OS: 15
OD: 13

## 2022-05-20 NOTE — IMPRESSION/PLAN
Impression: Pigmentary glaucoma, bilateral, severe stage
 + FHx of Glaucoma (MOM);  -Sulfa Allergy/Heart or Lung Problems/Sleep Apnea/Hx of Migraines
-  10/17/07; s/p Baerveldt 250 OS; possible steroid response OD ; brand name cosopt not liked; SLT OU(06)    ; -no fdts Plan: PLAN: On Lumigan QHS OU,  ( started 3 days ago after being off for 1 wk), OFF Cosopt PF BID OU ( 1 wk and 3 day, unable to get gtts from pharmacy) (  unable to use brand name cosopt (puja burning) ; OCT is similar ou and IOP is mildly higher ou , so will resume cosopt pf or cosopt and return in 1-2 months for IOP check   (( TARGET IOP ~< mid to low teens OU )) TESTS:   
5/20/22OCT - OD: Fair-85 ( 90 , 81, 87, 91, 95 91), normal; OS: Fair-63 ( 60 , 55, 59, 69, 63 63), pole thinning 2/25/22 Visual Field - OD: Fair-inf scatter; OS: Poor-sup nasal altitudinal loss, fluctuates 12/14/11 FDT few scattered defects OD, OS-shows signif sup and inf loss-stable;
4/11/12 Photos hazy OD, cupping OS stable; tests reviewed Discussed Glaucoma diagnosis in detail with patient. Emphasized and explained compliance. Poor compliance can lead to blindness. BRING YOUR DROPS EVERY VISIT.

## 2022-05-20 NOTE — IMPRESSION/PLAN
Impression: Myogenic ptosis of bilateral eyelids Plan: s/p Bilateral Upper Lid External Levator Resection OU  09/18/2014 Dr. Liyah Lazo, healed well.

## 2022-09-09 ENCOUNTER — OFFICE VISIT (OUTPATIENT)
Dept: URBAN - METROPOLITAN AREA CLINIC 24 | Facility: CLINIC | Age: 87
End: 2022-09-09
Payer: MEDICARE

## 2022-09-09 DIAGNOSIS — H26.493 OTHER SECONDARY CATARACT, BILATERAL: ICD-10-CM

## 2022-09-09 DIAGNOSIS — H40.1333 PIGMENTARY GLAUCOMA, BILATERAL, SEVERE STAGE: Primary | ICD-10-CM

## 2022-09-09 DIAGNOSIS — H04.123 DRY EYE SYNDROME OF BILATERAL LACRIMAL GLANDS: ICD-10-CM

## 2022-09-09 DIAGNOSIS — H02.423 MYOGENIC PTOSIS OF BILATERAL EYELIDS: ICD-10-CM

## 2022-09-09 PROCEDURE — 99213 OFFICE O/P EST LOW 20 MIN: CPT | Performed by: OPHTHALMOLOGY

## 2022-09-09 ASSESSMENT — INTRAOCULAR PRESSURE
OD: 11
OS: 13

## 2022-09-09 NOTE — IMPRESSION/PLAN
Impression: Pigmentary glaucoma, bilateral, severe stage
 + FHx of Glaucoma (MOM);  -Sulfa Allergy/Heart or Lung Problems/Sleep Apnea/Hx of Migraines
-  10/17/07; s/p Baerveldt 250 OS; possible steroid response OD ; brand name cosopt not liked; SLT OU(06)    ; -no fdts Plan: PLAN: On Lumigan QHS OU, ON Cosopt PF BID OU, (  unable to use brand name cosopt (puja burning) ; IOP is improving ou with resuming meds so cont cosopt pf bid ou and lum qhs ou and return in 3-4  months for IOP check/ VFT    (( TARGET IOP ~< mid to low teens OU )) TESTS:   
5/20/22OCT - OD: Fair-85 ( 90 , 81, 87, 91, 95 91), normal; OS: Fair-63 ( 60 , 55, 59, 69, 63 63), pole thinning 2/25/22 Visual Field - OD: Fair-inf scatter; OS: Poor-sup nasal altitudinal loss, fluctuates 12/14/11 FDT few scattered defects OD, OS-shows signif sup and inf loss-stable;
4/11/12 Photos hazy OD, cupping OS stable; tests reviewed Discussed Glaucoma diagnosis in detail with patient. Emphasized and explained compliance. Poor compliance can lead to blindness. BRING YOUR DROPS EVERY VISIT.

## 2022-09-09 NOTE — IMPRESSION/PLAN
Impression: Myogenic ptosis of bilateral eyelids Plan: s/p Bilateral Upper Lid External Levator Resection OU  09/18/2014 Dr. Gosia Maher, healed well.

## 2023-01-27 ENCOUNTER — OFFICE VISIT (OUTPATIENT)
Dept: URBAN - METROPOLITAN AREA CLINIC 24 | Facility: CLINIC | Age: 88
End: 2023-01-27
Payer: MEDICARE

## 2023-01-27 DIAGNOSIS — H04.123 DRY EYE SYNDROME OF BILATERAL LACRIMAL GLANDS: ICD-10-CM

## 2023-01-27 DIAGNOSIS — H40.1333 PIGMENTARY GLAUCOMA, BILATERAL, SEVERE STAGE: Primary | ICD-10-CM

## 2023-01-27 DIAGNOSIS — H26.493 OTHER SECONDARY CATARACT, BILATERAL: ICD-10-CM

## 2023-01-27 DIAGNOSIS — H02.423 MYOGENIC PTOSIS OF BILATERAL EYELIDS: ICD-10-CM

## 2023-01-27 PROCEDURE — 92083 EXTENDED VISUAL FIELD XM: CPT | Performed by: OPHTHALMOLOGY

## 2023-01-27 PROCEDURE — 99213 OFFICE O/P EST LOW 20 MIN: CPT | Performed by: OPHTHALMOLOGY

## 2023-01-27 RX ORDER — DORZOLAMIDE HYDROCHLORIDE AND TIMOLOL MALEATE 20; 5 MG/ML; MG/ML
SOLUTION/ DROPS OPHTHALMIC
Qty: 180 | Refills: 3 | Status: ACTIVE
Start: 2023-01-27

## 2023-01-27 RX ORDER — BIMATOPROST 0.1 MG/ML
0.01 % SOLUTION/ DROPS OPHTHALMIC
Qty: 7.5 | Refills: 3 | Status: ACTIVE
Start: 2023-01-27

## 2023-01-27 ASSESSMENT — INTRAOCULAR PRESSURE
OD: 10
OS: 13

## 2023-01-27 NOTE — IMPRESSION/PLAN
Impression: Pigmentary glaucoma, bilateral, severe stage
 + FHx of Glaucoma (MOM);  -Sulfa Allergy/Heart or Lung Problems/Sleep Apnea/Hx of Migraines
-  10/17/07; s/p Baerveldt 250 OS; possible steroid response OD ; brand name cosopt not liked; SLT OU(06)    ; -no fdts Plan: PLAN: On Lumigan QHS OU, ON Cosopt PF BID OU, (unable to use brand name cosopt (puja burning) ; VFT is  fluctuates ou but IOP is stable ou so cont cosopt pf bid ou and lum qhs ou and return in 3-4  months for IOP check/ OCT    (( TARGET IOP ~< mid to low teens OU )) TESTS:   
1/27/23 Visual Field - OD: Fair-inf scatter; OS: Poor-sup nasal altitudinal loss, fluctuates 5/20/22 OCT - OD: Fair-85 ( 90 , 81, 87, 91, 95 91), normal; OS: Fair-63 ( 60 , 55, 59, 69, 63 63), pole thinning 12/14/11 FDT few scattered defects OD, OS-shows signif sup and inf loss-stable;
4/11/12 Photos hazy OD, cupping OS stable; tests reviewed Discussed Glaucoma diagnosis in detail with patient. Emphasized and explained compliance. Poor compliance can lead to blindness. BRING YOUR DROPS EVERY VISIT.

## 2023-01-27 NOTE — IMPRESSION/PLAN
Impression: Myogenic ptosis of bilateral eyelids Plan: s/p Bilateral Upper Lid External Levator Resection OU  09/18/2014 Dr. Pauline Bah, healed well.

## 2023-04-21 ENCOUNTER — OFFICE VISIT (OUTPATIENT)
Dept: URBAN - METROPOLITAN AREA CLINIC 24 | Facility: CLINIC | Age: 88
End: 2023-04-21
Payer: MEDICARE

## 2023-04-21 DIAGNOSIS — H35.3131 NONEXUDATIVE MACULAR DEGENERATION, EARLY DRY STAGE, BILATERAL: ICD-10-CM

## 2023-04-21 DIAGNOSIS — H04.123 DRY EYE SYNDROME OF BILATERAL LACRIMAL GLANDS: ICD-10-CM

## 2023-04-21 DIAGNOSIS — H40.1333 PIGMENTARY GLAUCOMA, BILATERAL, SEVERE STAGE: Primary | ICD-10-CM

## 2023-04-21 DIAGNOSIS — H26.493 OTHER SECONDARY CATARACT, BILATERAL: ICD-10-CM

## 2023-04-21 DIAGNOSIS — H02.423 MYOGENIC PTOSIS OF BILATERAL EYELIDS: ICD-10-CM

## 2023-04-21 PROCEDURE — 99213 OFFICE O/P EST LOW 20 MIN: CPT | Performed by: OPHTHALMOLOGY

## 2023-04-21 PROCEDURE — 92133 CPTRZD OPH DX IMG PST SGM ON: CPT | Performed by: OPHTHALMOLOGY

## 2023-04-21 RX ORDER — DORZOLAMIDE HYDROCHLORIDE AND TIMOLOL MALEATE 20; 5 MG/ML; MG/ML
SOLUTION/ DROPS OPHTHALMIC
Qty: 180 | Refills: 1 | Status: ACTIVE
Start: 2023-04-21

## 2023-04-21 RX ORDER — BIMATOPROST 0.1 MG/ML
0.01 % SOLUTION/ DROPS OPHTHALMIC
Qty: 7.5 | Refills: 1 | Status: ACTIVE
Start: 2023-04-21

## 2023-04-21 ASSESSMENT — INTRAOCULAR PRESSURE
OS: 13
OD: 11

## 2023-04-21 NOTE — IMPRESSION/PLAN
Impression: Pigmentary glaucoma, bilateral, severe stage
 + FHx of Glaucoma (MOM);  -Sulfa Allergy/Heart or Lung Problems/Sleep Apnea/Hx of Migraines
-  10/17/07; s/p Baerveldt 250 OS; possible steroid response OD ; brand name cosopt not liked; SLT OU(06)    ; -no fdts Plan: PLAN: On Lumigan QHS OU, ON Cosopt PF BID OU, (unable to use brand name cosopt (puja burning) ; OCT is similar ou and IOP is holding well ou so cont cosopt pf bid ou and lum qhs ou and return in 3-4  months for IOP check   (( TARGET IOP ~< mid to low teens OU )) TESTS:   
4-21-23 OCT - OD: Fair-87 (85,  90 , 81, 87, 91, 95 91), normal; OS: Fair-61 (63,  60 , 55, 59, 69, 63 63), pole thinning 1/27/23 Visual Field - OD: Fair-inf scatter; OS: Poor-sup nasal altitudinal loss, fluctuates 12/14/11 FDT few scattered defects OD, OS-shows signif sup and inf loss-stable;
4/11/12 Photos hazy OD, cupping OS stable; tests reviewed Discussed Glaucoma diagnosis in detail with patient. Emphasized and explained compliance. Poor compliance can lead to blindness. BRING YOUR DROPS EVERY VISIT.

## 2023-04-21 NOTE — IMPRESSION/PLAN
Impression: Myogenic ptosis of bilateral eyelids Plan: s/p Bilateral Upper Lid External Levator Resection OU  09/18/2014 Dr. Anastasia Salguero, healed well.

## 2023-08-11 ENCOUNTER — OFFICE VISIT (OUTPATIENT)
Dept: URBAN - METROPOLITAN AREA CLINIC 24 | Facility: CLINIC | Age: 88
End: 2023-08-11
Payer: MEDICARE

## 2023-08-11 DIAGNOSIS — H40.1333 PIGMENTARY GLAUCOMA, BILATERAL, SEVERE STAGE: Primary | ICD-10-CM

## 2023-08-11 DIAGNOSIS — H02.423 MYOGENIC PTOSIS OF BILATERAL EYELIDS: ICD-10-CM

## 2023-08-11 DIAGNOSIS — H04.123 DRY EYE SYNDROME OF BILATERAL LACRIMAL GLANDS: ICD-10-CM

## 2023-08-11 DIAGNOSIS — H35.3131 NONEXUDATIVE MACULAR DEGENERATION, EARLY DRY STAGE, BILATERAL: ICD-10-CM

## 2023-08-11 DIAGNOSIS — H26.493 OTHER SECONDARY CATARACT, BILATERAL: ICD-10-CM

## 2023-08-11 PROCEDURE — 99213 OFFICE O/P EST LOW 20 MIN: CPT | Performed by: OPHTHALMOLOGY

## 2023-08-11 RX ORDER — BIMATOPROST 0.1 MG/ML
0.01 % SOLUTION/ DROPS OPHTHALMIC
Qty: 7.5 | Refills: 1 | Status: ACTIVE
Start: 2023-08-11

## 2023-08-11 RX ORDER — DORZOLAMIDE HYDROCHLORIDE AND TIMOLOL MALEATE 20; 5 MG/ML; MG/ML
SOLUTION/ DROPS OPHTHALMIC
Qty: 180 | Refills: 1 | Status: ACTIVE
Start: 2023-08-11

## 2023-08-11 ASSESSMENT — INTRAOCULAR PRESSURE
OS: 12
OD: 10

## 2023-08-22 ENCOUNTER — APPOINTMENT (RX ONLY)
Dept: URBAN - METROPOLITAN AREA CLINIC 323 | Facility: CLINIC | Age: 88
Setting detail: DERMATOLOGY
End: 2023-08-22

## 2023-08-22 DIAGNOSIS — D18.0 HEMANGIOMA: ICD-10-CM

## 2023-08-22 DIAGNOSIS — L81.4 OTHER MELANIN HYPERPIGMENTATION: ICD-10-CM

## 2023-08-22 DIAGNOSIS — L80 VITILIGO: ICD-10-CM

## 2023-08-22 DIAGNOSIS — D22 MELANOCYTIC NEVI: ICD-10-CM

## 2023-08-22 DIAGNOSIS — L82.1 OTHER SEBORRHEIC KERATOSIS: ICD-10-CM

## 2023-08-22 PROBLEM — D18.01 HEMANGIOMA OF SKIN AND SUBCUTANEOUS TISSUE: Status: ACTIVE | Noted: 2023-08-22

## 2023-08-22 PROBLEM — D22.61 MELANOCYTIC NEVI OF RIGHT UPPER LIMB, INCLUDING SHOULDER: Status: ACTIVE | Noted: 2023-08-22

## 2023-08-22 PROBLEM — D22.62 MELANOCYTIC NEVI OF LEFT UPPER LIMB, INCLUDING SHOULDER: Status: ACTIVE | Noted: 2023-08-22

## 2023-08-22 PROBLEM — D22.72 MELANOCYTIC NEVI OF LEFT LOWER LIMB, INCLUDING HIP: Status: ACTIVE | Noted: 2023-08-22

## 2023-08-22 PROBLEM — D22.71 MELANOCYTIC NEVI OF RIGHT LOWER LIMB, INCLUDING HIP: Status: ACTIVE | Noted: 2023-08-22

## 2023-08-22 PROBLEM — D22.5 MELANOCYTIC NEVI OF TRUNK: Status: ACTIVE | Noted: 2023-08-22

## 2023-08-22 PROCEDURE — ? COUNSELING

## 2023-08-22 PROCEDURE — ? FULL BODY SKIN EXAM

## 2023-08-22 PROCEDURE — ? SUNSCREEN RECOMMENDATIONS

## 2023-08-22 PROCEDURE — ? PRESCRIPTION

## 2023-08-22 PROCEDURE — 99213 OFFICE O/P EST LOW 20 MIN: CPT

## 2023-08-22 PROCEDURE — ? TREATMENT REGIMEN

## 2023-08-22 RX ORDER — CLOBETASOL PROPIONATE 0.5 MG/G
CREAM TOPICAL
Qty: 60 | Refills: 6 | Status: ERX | COMMUNITY
Start: 2023-08-22

## 2023-08-22 RX ADMIN — CLOBETASOL PROPIONATE: 0.5 CREAM TOPICAL at 00:00

## 2023-08-22 ASSESSMENT — LOCATION SIMPLE DESCRIPTION DERM
LOCATION SIMPLE: RIGHT CHEEK
LOCATION SIMPLE: UPPER BACK
LOCATION SIMPLE: RIGHT UPPER BACK
LOCATION SIMPLE: RIGHT FOREARM
LOCATION SIMPLE: ABDOMEN
LOCATION SIMPLE: LEFT FOREARM
LOCATION SIMPLE: RIGHT SHOULDER
LOCATION SIMPLE: LEFT SHOULDER
LOCATION SIMPLE: LEFT THIGH
LOCATION SIMPLE: RIGHT PRETIBIAL REGION
LOCATION SIMPLE: LEFT UPPER ARM
LOCATION SIMPLE: LEFT PRETIBIAL REGION
LOCATION SIMPLE: RIGHT THIGH

## 2023-08-22 ASSESSMENT — LOCATION DETAILED DESCRIPTION DERM
LOCATION DETAILED: RIGHT VENTRAL DISTAL FOREARM
LOCATION DETAILED: RIGHT ANTERIOR SHOULDER
LOCATION DETAILED: LEFT DISTAL PRETIBIAL REGION
LOCATION DETAILED: LEFT ANTERIOR DISTAL THIGH
LOCATION DETAILED: LEFT VENTRAL DISTAL FOREARM
LOCATION DETAILED: SUPERIOR THORACIC SPINE
LOCATION DETAILED: RIGHT PROXIMAL PRETIBIAL REGION
LOCATION DETAILED: RIGHT ANTERIOR PROXIMAL THIGH
LOCATION DETAILED: LEFT PROXIMAL PRETIBIAL REGION
LOCATION DETAILED: INFERIOR THORACIC SPINE
LOCATION DETAILED: RIGHT MID-UPPER BACK
LOCATION DETAILED: LEFT VENTRAL PROXIMAL FOREARM
LOCATION DETAILED: RIGHT DISTAL PRETIBIAL REGION
LOCATION DETAILED: LEFT ANTERIOR SHOULDER
LOCATION DETAILED: EPIGASTRIC SKIN
LOCATION DETAILED: RIGHT INFERIOR MEDIAL UPPER BACK
LOCATION DETAILED: LEFT ANTECUBITAL SKIN
LOCATION DETAILED: RIGHT INFERIOR CENTRAL MALAR CHEEK
LOCATION DETAILED: RIGHT VENTRAL PROXIMAL FOREARM

## 2023-08-22 ASSESSMENT — LOCATION ZONE DERM
LOCATION ZONE: FACE
LOCATION ZONE: ARM
LOCATION ZONE: LEG
LOCATION ZONE: TRUNK

## 2023-12-01 ENCOUNTER — OFFICE VISIT (OUTPATIENT)
Dept: URBAN - METROPOLITAN AREA CLINIC 24 | Facility: CLINIC | Age: 88
End: 2023-12-01
Payer: MEDICARE

## 2023-12-01 DIAGNOSIS — H35.3131 NONEXUDATIVE MACULAR DEGENERATION, EARLY DRY STAGE, BILATERAL: ICD-10-CM

## 2023-12-01 DIAGNOSIS — H26.493 OTHER SECONDARY CATARACT, BILATERAL: ICD-10-CM

## 2023-12-01 DIAGNOSIS — H02.423 MYOGENIC PTOSIS OF BILATERAL EYELIDS: ICD-10-CM

## 2023-12-01 DIAGNOSIS — H04.123 DRY EYE SYNDROME OF BILATERAL LACRIMAL GLANDS: ICD-10-CM

## 2023-12-01 DIAGNOSIS — H40.1333 PIGMENTARY GLAUCOMA, BILATERAL, SEVERE STAGE: Primary | ICD-10-CM

## 2023-12-01 PROCEDURE — 99213 OFFICE O/P EST LOW 20 MIN: CPT | Performed by: OPHTHALMOLOGY

## 2023-12-01 RX ORDER — DORZOLAMIDE HYDROCHLORIDE AND TIMOLOL MALEATE 20; 5 MG/ML; MG/ML
SOLUTION/ DROPS OPHTHALMIC
Qty: 180 | Refills: 1 | Status: ACTIVE
Start: 2023-12-01

## 2023-12-01 RX ORDER — BIMATOPROST 0.1 MG/ML
0.01 % SOLUTION/ DROPS OPHTHALMIC
Qty: 7.5 | Refills: 1 | Status: ACTIVE
Start: 2023-12-01

## 2023-12-01 ASSESSMENT — INTRAOCULAR PRESSURE
OS: 12
OD: 11

## 2024-06-14 ENCOUNTER — OFFICE VISIT (OUTPATIENT)
Dept: URBAN - METROPOLITAN AREA CLINIC 24 | Facility: CLINIC | Age: 89
End: 2024-06-14
Payer: MEDICARE

## 2024-06-14 DIAGNOSIS — H04.123 DRY EYE SYNDROME OF BILATERAL LACRIMAL GLANDS: ICD-10-CM

## 2024-06-14 DIAGNOSIS — H02.423 MYOGENIC PTOSIS OF BILATERAL EYELIDS: ICD-10-CM

## 2024-06-14 DIAGNOSIS — H35.3131 NONEXUDATIVE MACULAR DEGENERATION, EARLY DRY STAGE, BILATERAL: ICD-10-CM

## 2024-06-14 DIAGNOSIS — H40.1333 PIGMENTARY GLAUCOMA, BILATERAL, SEVERE STAGE: Primary | ICD-10-CM

## 2024-06-14 DIAGNOSIS — H26.493 OTHER SECONDARY CATARACT, BILATERAL: ICD-10-CM

## 2024-06-14 PROCEDURE — 99213 OFFICE O/P EST LOW 20 MIN: CPT | Performed by: OPHTHALMOLOGY

## 2024-06-14 PROCEDURE — 92083 EXTENDED VISUAL FIELD XM: CPT | Performed by: OPHTHALMOLOGY

## 2024-06-14 RX ORDER — BIMATOPROST 0.1 MG/ML
0.01 % SOLUTION/ DROPS OPHTHALMIC
Qty: 7.5 | Refills: 1 | Status: ACTIVE
Start: 2024-06-14

## 2024-06-14 RX ORDER — DORZOLAMIDE HYDROCHLORIDE AND TIMOLOL MALEATE 20; 5 MG/ML; MG/ML
SOLUTION/ DROPS OPHTHALMIC
Qty: 180 | Refills: 1 | Status: ACTIVE
Start: 2024-06-14

## 2024-06-14 ASSESSMENT — INTRAOCULAR PRESSURE
OD: 12
OS: 12

## 2024-10-04 ENCOUNTER — OFFICE VISIT (OUTPATIENT)
Dept: URBAN - METROPOLITAN AREA CLINIC 24 | Facility: CLINIC | Age: 89
End: 2024-10-04
Payer: MEDICARE

## 2024-10-04 DIAGNOSIS — H02.423 MYOGENIC PTOSIS OF BILATERAL EYELIDS: ICD-10-CM

## 2024-10-04 DIAGNOSIS — H40.1333 PIGMENTARY GLAUCOMA, BILATERAL, SEVERE STAGE: Primary | ICD-10-CM

## 2024-10-04 DIAGNOSIS — H35.3131 NONEXUDATIVE MACULAR DEGENERATION, EARLY DRY STAGE, BILATERAL: ICD-10-CM

## 2024-10-04 DIAGNOSIS — H04.123 DRY EYE SYNDROME OF BILATERAL LACRIMAL GLANDS: ICD-10-CM

## 2024-10-04 DIAGNOSIS — H26.493 OTHER SECONDARY CATARACT, BILATERAL: ICD-10-CM

## 2024-10-04 PROCEDURE — 92133 CPTRZD OPH DX IMG PST SGM ON: CPT | Performed by: OPHTHALMOLOGY

## 2024-10-04 PROCEDURE — 99213 OFFICE O/P EST LOW 20 MIN: CPT | Performed by: OPHTHALMOLOGY

## 2024-10-04 RX ORDER — BIMATOPROST 0.1 MG/ML
0.01 % SOLUTION/ DROPS OPHTHALMIC
Qty: 7.5 | Refills: 1 | Status: ACTIVE
Start: 2024-10-04

## 2024-10-04 RX ORDER — DORZOLAMIDE HYDROCHLORIDE AND TIMOLOL MALEATE 20; 5 MG/ML; MG/ML
SOLUTION/ DROPS OPHTHALMIC
Qty: 180 | Refills: 1 | Status: ACTIVE
Start: 2024-10-04

## 2024-10-04 ASSESSMENT — INTRAOCULAR PRESSURE
OD: 12
OS: 11

## 2025-07-02 ENCOUNTER — OFFICE VISIT (OUTPATIENT)
Dept: URBAN - METROPOLITAN AREA CLINIC 24 | Facility: CLINIC | Age: OVER 89
End: 2025-07-02
Payer: MEDICARE

## 2025-07-02 DIAGNOSIS — H40.1333 PIGMENTARY GLAUCOMA, BILATERAL, SEVERE STAGE: Primary | ICD-10-CM

## 2025-07-02 DIAGNOSIS — H26.493 OTHER SECONDARY CATARACT, BILATERAL: ICD-10-CM

## 2025-07-02 DIAGNOSIS — H35.3131 NONEXUDATIVE MACULAR DEGENERATION, EARLY DRY STAGE, BILATERAL: ICD-10-CM

## 2025-07-02 DIAGNOSIS — H04.123 DRY EYE SYNDROME OF BILATERAL LACRIMAL GLANDS: ICD-10-CM

## 2025-07-02 DIAGNOSIS — H02.423 MYOGENIC PTOSIS OF BILATERAL EYELIDS: ICD-10-CM

## 2025-07-02 PROCEDURE — 92083 EXTENDED VISUAL FIELD XM: CPT | Performed by: OPHTHALMOLOGY

## 2025-07-02 PROCEDURE — 99214 OFFICE O/P EST MOD 30 MIN: CPT | Performed by: OPHTHALMOLOGY

## 2025-07-02 RX ORDER — DORZOLAMIDE HYDROCHLORIDE AND TIMOLOL MALEATE 20; 5 MG/ML; MG/ML
SOLUTION/ DROPS OPHTHALMIC
Qty: 180 | Refills: 1 | Status: ACTIVE
Start: 2025-07-02

## 2025-07-02 RX ORDER — BIMATOPROST 0.1 MG/ML
0.01 % SOLUTION/ DROPS OPHTHALMIC
Qty: 7.5 | Refills: 1 | Status: ACTIVE
Start: 2025-07-02

## 2025-07-02 ASSESSMENT — INTRAOCULAR PRESSURE
OS: 13
OD: 12

## 2025-07-28 ENCOUNTER — OFFICE VISIT (OUTPATIENT)
Dept: URBAN - METROPOLITAN AREA CLINIC 24 | Facility: CLINIC | Age: OVER 89
End: 2025-07-28
Payer: MEDICARE

## 2025-07-28 DIAGNOSIS — H26.493 OTHER SECONDARY CATARACT, BILATERAL: ICD-10-CM

## 2025-07-28 DIAGNOSIS — H40.1333 PIGMENTARY GLAUCOMA, BILATERAL, SEVERE STAGE: ICD-10-CM

## 2025-07-28 DIAGNOSIS — H35.3131 NONEXUDATIVE AGE-RELATED MACULAR DEGENERATION, BILATERAL, EARLY DRY STAGE: Primary | ICD-10-CM

## 2025-07-28 DIAGNOSIS — H52.4 PRESBYOPIA: ICD-10-CM

## 2025-07-28 PROCEDURE — 92134 CPTRZ OPH DX IMG PST SGM RTA: CPT | Performed by: STUDENT IN AN ORGANIZED HEALTH CARE EDUCATION/TRAINING PROGRAM

## 2025-07-28 PROCEDURE — 99204 OFFICE O/P NEW MOD 45 MIN: CPT | Performed by: STUDENT IN AN ORGANIZED HEALTH CARE EDUCATION/TRAINING PROGRAM

## 2025-07-28 ASSESSMENT — VISUAL ACUITY
OD: 20/20
OS: 20/25

## 2025-07-28 ASSESSMENT — INTRAOCULAR PRESSURE
OD: 10
OS: 8